# Patient Record
Sex: FEMALE | Race: WHITE | NOT HISPANIC OR LATINO | URBAN - METROPOLITAN AREA
[De-identification: names, ages, dates, MRNs, and addresses within clinical notes are randomized per-mention and may not be internally consistent; named-entity substitution may affect disease eponyms.]

---

## 2024-02-18 ENCOUNTER — HOSPITAL ENCOUNTER (EMERGENCY)
Facility: MEDICAL CENTER | Age: 69
End: 2024-02-18
Attending: EMERGENCY MEDICINE
Payer: MEDICARE

## 2024-02-18 ENCOUNTER — PHARMACY VISIT (OUTPATIENT)
Dept: PHARMACY | Facility: MEDICAL CENTER | Age: 69
End: 2024-02-18
Payer: COMMERCIAL

## 2024-02-18 VITALS
SYSTOLIC BLOOD PRESSURE: 150 MMHG | OXYGEN SATURATION: 92 % | BODY MASS INDEX: 39.4 KG/M2 | HEIGHT: 68 IN | HEART RATE: 73 BPM | RESPIRATION RATE: 18 BRPM | WEIGHT: 260 LBS | DIASTOLIC BLOOD PRESSURE: 80 MMHG | TEMPERATURE: 97.6 F

## 2024-02-18 DIAGNOSIS — R00.2 PALPITATIONS: ICD-10-CM

## 2024-02-18 DIAGNOSIS — I10 HYPERTENSION, UNSPECIFIED TYPE: ICD-10-CM

## 2024-02-18 DIAGNOSIS — R42 LIGHTHEADEDNESS: ICD-10-CM

## 2024-02-18 DIAGNOSIS — I48.91 ATRIAL FIBRILLATION WITH RVR (HCC): Primary | ICD-10-CM

## 2024-02-18 LAB
ALBUMIN SERPL BCP-MCNC: 4.5 G/DL (ref 3.2–4.9)
ALBUMIN/GLOB SERPL: 1.8 G/DL
ALP SERPL-CCNC: 65 U/L (ref 30–99)
ALT SERPL-CCNC: 20 U/L (ref 2–50)
ANION GAP SERPL CALC-SCNC: 14 MMOL/L (ref 7–16)
AST SERPL-CCNC: 18 U/L (ref 12–45)
BASOPHILS # BLD AUTO: 0.9 % (ref 0–1.8)
BASOPHILS # BLD: 0.07 K/UL (ref 0–0.12)
BILIRUB SERPL-MCNC: 0.3 MG/DL (ref 0.1–1.5)
BUN SERPL-MCNC: 23 MG/DL (ref 8–22)
CALCIUM ALBUM COR SERPL-MCNC: 8.5 MG/DL (ref 8.5–10.5)
CALCIUM SERPL-MCNC: 8.9 MG/DL (ref 8.5–10.5)
CHLORIDE SERPL-SCNC: 100 MMOL/L (ref 96–112)
CO2 SERPL-SCNC: 24 MMOL/L (ref 20–33)
CREAT SERPL-MCNC: 0.51 MG/DL (ref 0.5–1.4)
EKG IMPRESSION: NORMAL
EKG IMPRESSION: NORMAL
EOSINOPHIL # BLD AUTO: 0.12 K/UL (ref 0–0.51)
EOSINOPHIL NFR BLD: 1.5 % (ref 0–6.9)
ERYTHROCYTE [DISTWIDTH] IN BLOOD BY AUTOMATED COUNT: 39.6 FL (ref 35.9–50)
GFR SERPLBLD CREATININE-BSD FMLA CKD-EPI: 102 ML/MIN/1.73 M 2
GLOBULIN SER CALC-MCNC: 2.5 G/DL (ref 1.9–3.5)
GLUCOSE SERPL-MCNC: 119 MG/DL (ref 65–99)
HCT VFR BLD AUTO: 42.8 % (ref 37–47)
HGB BLD-MCNC: 14.8 G/DL (ref 12–16)
IMM GRANULOCYTES # BLD AUTO: 0.03 K/UL (ref 0–0.11)
IMM GRANULOCYTES NFR BLD AUTO: 0.4 % (ref 0–0.9)
LYMPHOCYTES # BLD AUTO: 1.85 K/UL (ref 1–4.8)
LYMPHOCYTES NFR BLD: 22.6 % (ref 22–41)
MCH RBC QN AUTO: 29.7 PG (ref 27–33)
MCHC RBC AUTO-ENTMCNC: 34.6 G/DL (ref 32.2–35.5)
MCV RBC AUTO: 85.9 FL (ref 81.4–97.8)
MONOCYTES # BLD AUTO: 0.7 K/UL (ref 0–0.85)
MONOCYTES NFR BLD AUTO: 8.6 % (ref 0–13.4)
NEUTROPHILS # BLD AUTO: 5.41 K/UL (ref 1.82–7.42)
NEUTROPHILS NFR BLD: 66 % (ref 44–72)
NRBC # BLD AUTO: 0 K/UL
NRBC BLD-RTO: 0 /100 WBC (ref 0–0.2)
NT-PROBNP SERPL IA-MCNC: 172 PG/ML (ref 0–125)
PLATELET # BLD AUTO: 375 K/UL (ref 164–446)
PMV BLD AUTO: 9.8 FL (ref 9–12.9)
POTASSIUM SERPL-SCNC: 3.6 MMOL/L (ref 3.6–5.5)
PROT SERPL-MCNC: 7 G/DL (ref 6–8.2)
RBC # BLD AUTO: 4.98 M/UL (ref 4.2–5.4)
SODIUM SERPL-SCNC: 138 MMOL/L (ref 135–145)
TROPONIN T SERPL-MCNC: 6 NG/L (ref 6–19)
WBC # BLD AUTO: 8.2 K/UL (ref 4.8–10.8)

## 2024-02-18 PROCEDURE — 84484 ASSAY OF TROPONIN QUANT: CPT

## 2024-02-18 PROCEDURE — 99285 EMERGENCY DEPT VISIT HI MDM: CPT

## 2024-02-18 PROCEDURE — 93005 ELECTROCARDIOGRAM TRACING: CPT | Performed by: EMERGENCY MEDICINE

## 2024-02-18 PROCEDURE — 94760 N-INVAS EAR/PLS OXIMETRY 1: CPT

## 2024-02-18 PROCEDURE — RXMED WILLOW AMBULATORY MEDICATION CHARGE: Performed by: EMERGENCY MEDICINE

## 2024-02-18 PROCEDURE — 700102 HCHG RX REV CODE 250 W/ 637 OVERRIDE(OP): Performed by: EMERGENCY MEDICINE

## 2024-02-18 PROCEDURE — 83880 ASSAY OF NATRIURETIC PEPTIDE: CPT

## 2024-02-18 PROCEDURE — 85025 COMPLETE CBC W/AUTO DIFF WBC: CPT

## 2024-02-18 PROCEDURE — 36415 COLL VENOUS BLD VENIPUNCTURE: CPT

## 2024-02-18 PROCEDURE — A9270 NON-COVERED ITEM OR SERVICE: HCPCS | Performed by: EMERGENCY MEDICINE

## 2024-02-18 PROCEDURE — 80053 COMPREHEN METABOLIC PANEL: CPT

## 2024-02-18 RX ORDER — FLECAINIDE ACETATE 100 MG/1
100 TABLET ORAL 2 TIMES DAILY PRN
Qty: 30 TABLET | Refills: 0 | Status: SHIPPED | OUTPATIENT
Start: 2024-02-18

## 2024-02-18 RX ORDER — METOPROLOL SUCCINATE 25 MG/1
25 TABLET, EXTENDED RELEASE ORAL ONCE
Status: COMPLETED | OUTPATIENT
Start: 2024-02-18 | End: 2024-02-18

## 2024-02-18 RX ORDER — METOPROLOL SUCCINATE 25 MG/1
25 TABLET, EXTENDED RELEASE ORAL DAILY
Qty: 30 TABLET | Refills: 0 | Status: SHIPPED | OUTPATIENT
Start: 2024-02-18

## 2024-02-18 RX ADMIN — METOPROLOL SUCCINATE 25 MG: 25 TABLET, EXTENDED RELEASE ORAL at 01:38

## 2024-02-18 ASSESSMENT — LIFESTYLE VARIABLES: DO YOU DRINK ALCOHOL: NO

## 2024-02-18 ASSESSMENT — PAIN DESCRIPTION - PAIN TYPE: TYPE: ACUTE PAIN

## 2024-02-18 NOTE — DISCHARGE INSTRUCTIONS
Thankfully you converted out of your A-fib spontaneously.  I have refilled your flecainide prescription, take as directed by your cardiologist.  You would likely benefit from seeing an electrophysiologist considering this is her third episode of A-fib.  I will also prescribe you metoprolol, which you can take every day to help prevent going into A-fib, this is a beta-blocker medication and should help.  I do want you to follow-up with your PCP so they can review all of your medications with you as well as her cardiologist.  If you have any worsening symptoms, please return to the ED.  Thank you for coming in today.

## 2024-02-18 NOTE — ED TRIAGE NOTES
"Chief Complaint   Patient presents with    Palpitations     Pt reports anxiety/palpitations x 2200 today. Hx of afib, converted x 2 w/ medication x 2 yrs. Denies SOB. HR upon assessment 141 bpm.      Pt prescribed flecainide 150 mg x 1 tab. Noted med bottle  in . EKG completed at triage.       69 y/o female aaox4.       BP (!) 168/103   Pulse (!) 142   Temp 36.4 °C (97.6 °F) (Temporal)   Resp 20   Ht 1.727 m (5' 8\")   Wt 118 kg (260 lb)   SpO2 95%   BMI 39.53 kg/m²     "

## 2024-02-18 NOTE — ED PROVIDER NOTES
ED Provider Note    Scribed for Jj Fung by Liss Hutton. 2024  1:25 AM    Primary care provider: No primary care provider noted.  Means of arrival: Private vehicle.  History obtained from: Patient  History limited by: None    CHIEF COMPLAINT  Chief Complaint   Patient presents with    Palpitations     Pt reports anxiety/palpitations x 2200 today. Hx of afib, converted x 2 w/ medication x 2 yrs. Denies SOB. HR upon assessment 141 bpm.        EXTERNAL RECORDS REVIEWED  No records available for review.     HPI/ROS  LIMITATION TO HISTORY   Select: : None  OUTSIDE HISTORIAN(S):  Significant other  at bedside to confirm sequence of events and collateral information provided.     HPI  Melodie Grace is a 68 y.o. female who has history of atrial fibrillation, converted x2 with medication presents to the Emergency Department for evaluation of palpitations onset 10 PM tonight. She describes that she began to have anxiety and palpitations around 10 PM today. The patient was prescribed flecainide 150 mg about 2 years ago and took a tablet today, but the medication  last year in . She does note that she drank 2 [employer] Ultras last night and tonight. The patient also mentions that she had more caffeine than normal today. The patient reports that she has had this happen to her 2 times before. She states that she was able to get out of it on her own the first time this occurred, but states that the second time she was given flecainide, which alleviated her symptoms. The patient denies any lightheadedness or weakness.     REVIEW OF SYSTEMS  As above, all other systems reviewed and are negative.   See HPI for further details.     PAST MEDICAL HISTORY   has a past medical history of Hypertension.    SURGICAL HISTORY  patient denies any surgical history    SOCIAL HISTORY  Social History     Tobacco Use    Smoking status: Never    Smokeless tobacco: Never   Vaping Use    Vaping  Use: Never used   Substance Use Topics    Alcohol use: Not Currently    Drug use: Never      Social History     Substance and Sexual Activity   Drug Use Never     FAMILY HISTORY  History reviewed. No pertinent family history.    CURRENT MEDICATIONS  Home Medications       Reviewed by Tomi Flowers R.N. (Registered Nurse) on 02/18/24 at 0118  Med List Status: Not Addressed     Medication Last Dose Status        Patient Sonu Taking any Medications                         ALLERGIES  Allergies   Allergen Reactions    Amoxicillin Anaphylaxis    Morphine Hives       PHYSICAL EXAM    VITAL SIGNS:   Vitals:    02/18/24 0130 02/18/24 0150 02/18/24 0200 02/18/24 0229   BP: (!) 168/103  (!) 150/80    Pulse: 84 79 74 73   Resp: 20 16 17 18   Temp:       TempSrc:       SpO2: 95% 94% 93% 92%   Weight:       Height:         Vitals: My interpretation: hypertensive, not tachycardic, afebrile, not hypoxic    Reinterpretation of vitals: Improving    Cardiac Monitor Interpretation: The cardiac monitor revealed normal atrial fibrillation with RVR as interpreted by me. The cardiac monitor was ordered secondary to the patient's history of arrhythmia and to monitor for dysrhythmia and/or tachycardia.    PE:   Gen: sitting comfortably, speaking clearly, appears in no acute distress  ENT: Mucous membranes moist, posterior pharynx clear, uvula midline, nares patent bilaterally   Neck: Supple, FROM  Pulmonary: Lungs are clear to auscultation bilaterally. No tachypnea  CV: Irregularly irregular heart rate, with tachycardia, hypertensive, no murmur appreciated, pulses 2+ in both upper and lower extremities  Abdomen: soft, NT/ND; no rebound/guarding  : no CVA or suprapubic tenderness   Neuro: A&Ox4 (person, place, time, situation), speech fluent, gait steady, no focal deficits appreciated  Skin: No rash or lesions.  No pallor or jaundice.  No cyanosis.  Warm and dry.     DIAGNOSTIC STUDIES / PROCEDURES    LABS  Results for orders placed  or performed during the hospital encounter of 02/18/24   CBC WITH DIFFERENTIAL   Result Value Ref Range    WBC 8.2 4.8 - 10.8 K/uL    RBC 4.98 4.20 - 5.40 M/uL    Hemoglobin 14.8 12.0 - 16.0 g/dL    Hematocrit 42.8 37.0 - 47.0 %    MCV 85.9 81.4 - 97.8 fL    MCH 29.7 27.0 - 33.0 pg    MCHC 34.6 32.2 - 35.5 g/dL    RDW 39.6 35.9 - 50.0 fL    Platelet Count 375 164 - 446 K/uL    MPV 9.8 9.0 - 12.9 fL    Neutrophils-Polys 66.00 44.00 - 72.00 %    Lymphocytes 22.60 22.00 - 41.00 %    Monocytes 8.60 0.00 - 13.40 %    Eosinophils 1.50 0.00 - 6.90 %    Basophils 0.90 0.00 - 1.80 %    Immature Granulocytes 0.40 0.00 - 0.90 %    Nucleated RBC 0.00 0.00 - 0.20 /100 WBC    Neutrophils (Absolute) 5.41 1.82 - 7.42 K/uL    Lymphs (Absolute) 1.85 1.00 - 4.80 K/uL    Monos (Absolute) 0.70 0.00 - 0.85 K/uL    Eos (Absolute) 0.12 0.00 - 0.51 K/uL    Baso (Absolute) 0.07 0.00 - 0.12 K/uL    Immature Granulocytes (abs) 0.03 0.00 - 0.11 K/uL    NRBC (Absolute) 0.00 K/uL   COMP METABOLIC PANEL   Result Value Ref Range    Sodium 138 135 - 145 mmol/L    Potassium 3.6 3.6 - 5.5 mmol/L    Chloride 100 96 - 112 mmol/L    Co2 24 20 - 33 mmol/L    Anion Gap 14.0 7.0 - 16.0    Glucose 119 (H) 65 - 99 mg/dL    Bun 23 (H) 8 - 22 mg/dL    Creatinine 0.51 0.50 - 1.40 mg/dL    Calcium 8.9 8.5 - 10.5 mg/dL    Correct Calcium 8.5 8.5 - 10.5 mg/dL    AST(SGOT) 18 12 - 45 U/L    ALT(SGPT) 20 2 - 50 U/L    Alkaline Phosphatase 65 30 - 99 U/L    Total Bilirubin 0.3 0.1 - 1.5 mg/dL    Albumin 4.5 3.2 - 4.9 g/dL    Total Protein 7.0 6.0 - 8.2 g/dL    Globulin 2.5 1.9 - 3.5 g/dL    A-G Ratio 1.8 g/dL   TROPONIN   Result Value Ref Range    Troponin T 6 6 - 19 ng/L   proBrain Natriuretic Peptide, NT   Result Value Ref Range    NT-proBNP 172 (H) 0 - 125 pg/mL   ESTIMATED GFR   Result Value Ref Range    GFR (CKD-EPI) 102 >60 mL/min/1.73 m 2   EKG   Result Value Ref Range    Report       [facility] Emergency Dept.    Test Date:  2024-02-18  Pt  Name:    MELODIE JOHNSON                Department: ER  MRN:        [id number]                      Room:  Gender:     Female                       Technician: [id number]  :        [id number]                   Requested By:ER TRIAGE PROTOCOL  Order #:    [id number]                    Reading MD: Jj Fung    Measurements  Intervals                                Axis  Rate:       81                           P:          41  UT:         185                          QRS:        16  QRSD:       102                          T:          23  QT:         395  QTc:        459    Interpretive Statements  Sinus rhythm  Baseline wander in lead(s) V2  No previous ECG available for comparison  Electronically Signed On 2024 02:32:41 PST by Jj Fung        All labs reviewed by me. Labs were compared to prior labs if they were available. Significant for no leukocytosis, no anemia, normal electrolytes, normal renal function, normal liver enzymes, normal bilirubin, troponin negative, BNP negative.    COURSE & MEDICAL DECISION MAKING  Nursing notes, VS, PMSFHx, labs, EKG reviewed in chart.    Heart Score: Low    ED Observation Status? No; Patient does not meet criteria for ED Observation.     Ddx: A-fib, arrhythmia, ischemic heart disease    MDM: 1:25 AM Melodie Johnson is a 68 y.o. female who presented with evaluation of sudden onset of palpitations a few hours prior to arrival to the ED.  Patient has had 2 episodes of A-fib previously.  She spontaneously converted out of these and does have flecainide but the prescription was , she did take 1 dose prior to arrival to the ED.  She feels slightly lightheaded and having palpitations.  Arrives here with A-fib with RVR.  But hemodynamically stable and vital signs otherwise unremarkable.  She is quite well-appearing and in no acute distress at time of my evaluation with a benign physical exam other than obvious irregular heart rate with tachycardia and EKG showing  obvious A-fib with RVR.  Ordered labs, I discussed with patient that would make sure blood work is okay consider cardioversion.  While discussing with the patient, she spontaneously cardioverted on her own to normal sinus rhythm.  She was observed for 2 hours and had no arrhythmia or return to A-fib.  Her repeat EKG shows sinus rhythm with no ischemic changes. All labs reviewed by me. Labs were compared to prior labs if they were available. Significant for no leukocytosis, no anemia, normal electrolytes, normal renal function, normal liver enzymes, normal bilirubin, troponin negative, BNP negative.  Patient did receive a dose of metoprolol XL here in the ED and prescription was sent.  She is from [place] and will follow-up with her cardiologist and PCP for further evaluation and treatment.  She did ask me for a refill of her flecainide medication which was provided as well as a prescription for the metoprolol XL 25 mg a day to help prevent A-fib.  She will follow-up with her outpatient team.  Discussed return precautions with her and her .  Her  at bedside help provide collateral formation.  They verbalized understanding and are amenable.    ADDITIONAL PROBLEM LIST AND DISPOSITION    I have discussed management of the patient with the following physicians and JOHN's:  None.    Discussion of management with other QHP or appropriate source(s): None     Escalation of care considered, and ultimately not performed:diagnostic imaging    Barriers to care at this time, including but not limited to: Patient does not have established PCP.     Decision tools and prescription drugs considered including, but not limited to: HEART Score low .    FINAL IMPRESSION  1. Atrial fibrillation with RVR (HCC) Acute   2. Palpitations Acute   3. Hypertension, unspecified type Acute   4. Lightheadedness Acute      ILiss (Scribalisha), am scribing for, and in the presence of, Jj FISHMAN  [place].    Electronically signed by: Liss Hutton (Scribe), 2/18/2024    I, Jj Fung personally performed the services described in this documentation, as scribed by Liss Hutton in my presence, and it is both accurate and complete.    The note accurately reflects work and decisions made by me.  Jj Fung  2/18/2024  2:41 AM

## 2024-02-18 NOTE — ED NOTES
EKG done by this RN, shown to JARETT Fung. Charge RN updated on pt condition. Pt wheeled to [facility] 11 by this RN. Report to Tomi GORDON